# Patient Record
Sex: MALE | Race: WHITE | URBAN - METROPOLITAN AREA
[De-identification: names, ages, dates, MRNs, and addresses within clinical notes are randomized per-mention and may not be internally consistent; named-entity substitution may affect disease eponyms.]

---

## 2023-08-30 ENCOUNTER — OFFICE VISIT (OUTPATIENT)
Age: 48
End: 2023-08-30
Payer: COMMERCIAL

## 2023-08-30 VITALS
WEIGHT: 315 LBS | HEART RATE: 73 BPM | HEIGHT: 72 IN | SYSTOLIC BLOOD PRESSURE: 138 MMHG | OXYGEN SATURATION: 97 % | BODY MASS INDEX: 42.66 KG/M2 | DIASTOLIC BLOOD PRESSURE: 89 MMHG

## 2023-08-30 DIAGNOSIS — L97.919 VENOUS ULCER OF RIGHT LOWER EXTREMITY WITH VARICOSE VEINS (HCC): Primary | ICD-10-CM

## 2023-08-30 DIAGNOSIS — I83.019 VENOUS ULCER OF RIGHT LOWER EXTREMITY WITH VARICOSE VEINS (HCC): Primary | ICD-10-CM

## 2023-08-30 PROCEDURE — 99204 OFFICE O/P NEW MOD 45 MIN: CPT | Performed by: SURGERY

## 2023-08-30 RX ORDER — LISINOPRIL 40 MG/1
1 TABLET ORAL DAILY
COMMUNITY
Start: 2019-03-22

## 2023-08-30 RX ORDER — METOPROLOL TARTRATE 50 MG/1
50 TABLET, FILM COATED ORAL 2 TIMES DAILY WITH MEALS
COMMUNITY
Start: 2023-06-01

## 2023-08-30 RX ORDER — HYDRALAZINE HYDROCHLORIDE 100 MG/1
TABLET, FILM COATED ORAL
COMMUNITY
Start: 2019-01-17

## 2023-08-30 NOTE — PROGRESS NOTES
[facility]  [address]  Dept: [phone]           Chart reviewed for the following:   Casey HUDDLESTON, [address] [facility], have reviewed the medications and updated the Allergic reactions for [address] performed immediately prior to start of procedure:   Casey HUDDLESTON MA, have performed the following reviews on Elizabeth Green prior to the start of the procedure:            * Patient was identified by name and date of birth   * Agreement that Tonnie Petersont boot removed and reapplied   * Procedure site verified   * Patient was positioned for comfort  * Verbal consent was given by patient     Time: 1030      Date of procedure: 8/30/2023    Procedure performed by:   Crystal Lynn MD    How tolerated by patient: Tolerated                                                                                      Comments:  Applied hydrophera blue, drawtex, and unna boot  to right leg
currently treated with hydrofera blue. Wound healing has stalled, wound present > 1 year. Aguilar Mckinney CMA, instructed to apply hydrofera blue to wound and apply compression wrap (unna boot)       RLE varicose veins, edema and ulceration. - discussed pathophysiology of venous insufficiency and possible contribution to lack of wound healing progress  - Will schedule RLE venous reflux study  - patient to follow up after study completed         We reviewed the plan with the patient and the patient understands. In excess of 45 minutes were spent in face-to-face time with this patient, more than 50% of which was devoted to discussing diagnosis, treatment plan and possible intervention. Jose Macias MD PhD  Vascular Surgery          PLEASE NOTE:  This document has been produced using voice recognition software. Unrecognized errors in transcription may be present.

## 2023-09-06 ENCOUNTER — NURSE ONLY (OUTPATIENT)
Age: 48
End: 2023-09-06

## 2023-09-06 VITALS — BODY MASS INDEX: 42.66 KG/M2 | WEIGHT: 315 LBS | HEIGHT: 72 IN

## 2023-09-06 DIAGNOSIS — L97.919 VENOUS ULCER OF RIGHT LOWER EXTREMITY WITH VARICOSE VEINS (HCC): Primary | ICD-10-CM

## 2023-09-06 DIAGNOSIS — I83.019 VENOUS ULCER OF RIGHT LOWER EXTREMITY WITH VARICOSE VEINS (HCC): Primary | ICD-10-CM

## 2023-09-06 NOTE — PROGRESS NOTES
[facility]  [address]  Dept: [phone]           Chart reviewed for the following:   Demetrio Davenport LPN, have reviewed the medications and updated the Allergic reactions for [address] performed immediately prior to start of procedure:   Demetrio Davenport LPN, have performed the following reviews on [facility] prior to the start of the procedure:            * Patient was identified by name and date of birth   * Agreement that Shelton Nails boot removed and reapplied   * Procedure site verified   * Patient was positioned for comfort  * Verbal consent was given by patient     Time: 1746Z      Date of procedure: 9/6/2023    Procedure performed by:  VVS NURSE    How tolerated by patient: tolerated well     Comments:  adaptic to wound bed area; hydrofera blue applied; covered with abd pad; unna boot applied; secured with coban dressing ; tolerated well.

## 2023-09-14 ENCOUNTER — OFFICE VISIT (OUTPATIENT)
Age: 48
End: 2023-09-14
Payer: COMMERCIAL

## 2023-09-14 DIAGNOSIS — I83.019 VENOUS ULCER OF RIGHT LOWER EXTREMITY WITH VARICOSE VEINS (HCC): Primary | ICD-10-CM

## 2023-09-14 DIAGNOSIS — L97.919 VENOUS ULCER OF RIGHT LOWER EXTREMITY WITH VARICOSE VEINS (HCC): Primary | ICD-10-CM

## 2023-09-14 DIAGNOSIS — I10 HYPERTENSION: ICD-10-CM

## 2023-09-14 DIAGNOSIS — I63.9 STROKE (HCC): ICD-10-CM

## 2023-09-14 PROCEDURE — 3074F SYST BP LT 130 MM HG: CPT | Performed by: SURGERY

## 2023-09-14 PROCEDURE — 3078F DIAST BP <80 MM HG: CPT | Performed by: SURGERY

## 2023-09-14 PROCEDURE — 99213 OFFICE O/P EST LOW 20 MIN: CPT | Performed by: SURGERY

## 2023-09-14 NOTE — PROGRESS NOTES
Follow Up Wound Care Note    Nargis Valentin is a 52 y.o. male with  has a past medical history of Hypertension and Stroke ([address]) (2017). .     Patient returns in follow up for wound check and wound care. Since his last visit he has undergone a venous insufficiency study. The most recent PVL was reviewed and discussed with the patient. This shows no evidence of venous insufficiency or DVT    Interpretation Summary         No evidence of deep vein or superficial vein thrombosis in the right lower extremity. Vessels demonstrate normal compressibility, color filling, and phasic and spontaneous flow. No evidence of superficial venous reflux in the great or small saphenous veins. Interstitial edema visualized in the calf. For comparison purposes, the left common femoral vein was briefly interrogated. The vein demonstrates normal color filling and compressibility. Doppler flow was phasic and spontaneous. Procedure Details    A gray scale, color Doppler imaging and spectral Doppler analysis ultrasound was performed. During the study longitudinal and transverse views were obtained. Pulsed wave doppler was performed. Overall the study quality was adequate. Study was technically difficult due to: edema. Lower Extremity Venous Findings    Right Lower Venous    No evidence of deep vein or superficial vein thrombosis. The common femoral, saphenofemoral junction, profunda femoral, femoral, popliteal, posterior tibial, peroneal, greater saphenous, and small saphenous veins were imaged in the transverse view and showed normal compressibility. The common femoral, popliteal, middle femoral, posterior tibial, and peroneal veins were imaged in the longitudinal view and showed normal color filling and normal phasic and spontaneous flow. Peroneal Vein: Not visualized and grossly patent (cannot exclude non-occlusive thrombus). Posterior tibial artery Doppler waveforms are multiphasic.    Reflux study patient

## 2023-09-15 VITALS
RESPIRATION RATE: 18 BRPM | BODY MASS INDEX: 42.66 KG/M2 | HEART RATE: 86 BPM | SYSTOLIC BLOOD PRESSURE: 128 MMHG | HEIGHT: 72 IN | OXYGEN SATURATION: 99 % | DIASTOLIC BLOOD PRESSURE: 70 MMHG | WEIGHT: 315 LBS

## 2023-09-15 PROBLEM — I63.9 STROKE (HCC): Status: ACTIVE | Noted: 2017-01-01

## 2023-09-15 PROBLEM — I10 HYPERTENSION: Status: ACTIVE | Noted: 2023-09-15

## 2023-09-15 RX ORDER — FUROSEMIDE 40 MG/1
TABLET ORAL
COMMUNITY
Start: 2023-09-13

## 2023-09-15 RX ORDER — LISINOPRIL 10 MG/1
TABLET ORAL
COMMUNITY
Start: 2023-09-13

## 2023-09-15 ASSESSMENT — PATIENT HEALTH QUESTIONNAIRE - PHQ9
SUM OF ALL RESPONSES TO PHQ QUESTIONS 1-9: 0
SUM OF ALL RESPONSES TO PHQ9 QUESTIONS 1 & 2: 0
SUM OF ALL RESPONSES TO PHQ QUESTIONS 1-9: 0
1. LITTLE INTEREST OR PLEASURE IN DOING THINGS: 0
2. FEELING DOWN, DEPRESSED OR HOPELESS: 0

## 2023-09-21 ENCOUNTER — NURSE ONLY (OUTPATIENT)
Age: 48
End: 2023-09-21

## 2023-09-21 DIAGNOSIS — L97.919 VENOUS ULCER OF RIGHT LOWER EXTREMITY WITH VARICOSE VEINS (HCC): Primary | ICD-10-CM

## 2023-09-21 DIAGNOSIS — I83.019 VENOUS ULCER OF RIGHT LOWER EXTREMITY WITH VARICOSE VEINS (HCC): Primary | ICD-10-CM

## 2023-09-21 NOTE — PROGRESS NOTES
[facility]  [address]  Dept: [phone]           Chart reviewed for the following:   Odalis Vasquez MA, have reviewed the medications and updated the Allergic reactions for [address] performed immediately prior to start of procedure:   Odalis Vasquez MA, have performed the following reviews on Domnick Session prior to the start of the procedure:            * Patient was identified by name and date of birth   * Agreement that [place] Dellen boot removed and reapplied   * Procedure site verified   * Patient was positioned for comfort  * Verbal consent was given by patient     Time: 11 am       Date of procedure: 9/21/2023    Procedure performed by:  VVS NURSE    How tolerated by patient: tolerated     Comments: removed old dressing, cleaned the area, measured wound it's 1.5cm x 1.5cm  picture in media, applied hydrofera blue abd pad wrapped with Ildefonso Dellen boot and coban

## 2023-09-28 ENCOUNTER — NURSE ONLY (OUTPATIENT)
Age: 48
End: 2023-09-28

## 2023-09-28 VITALS — HEIGHT: 72 IN | WEIGHT: 315 LBS | BODY MASS INDEX: 42.66 KG/M2

## 2023-09-28 DIAGNOSIS — L97.919 VENOUS ULCER OF RIGHT LOWER EXTREMITY WITH VARICOSE VEINS (HCC): Primary | ICD-10-CM

## 2023-09-28 DIAGNOSIS — I83.019 VENOUS ULCER OF RIGHT LOWER EXTREMITY WITH VARICOSE VEINS (HCC): Primary | ICD-10-CM

## 2023-09-28 NOTE — PROGRESS NOTES
[facility]  [address]  Dept: [phone]           Chart reviewed for the following:   Batool HUDDLESTON, [address] [facility], have reviewed the medications and updated the Allergic reactions for [address] performed immediately prior to start of procedure:   Batool HUDDLESTON MA, have performed the following reviews on [facility] prior to the start of the procedure:            * Patient was identified by name and date of birth   * Agreement that Caldwell Primus boot removed and reapplied   * Procedure site verified   * Patient was positioned for comfort  * Verbal consent was given by patient     Time: 0170      Date of procedure: 9/28/2023    Procedure performed by:  VVS NURSE    How tolerated by patient: Tolerated                                                                                     Comments:  Washed leg with ez scrub. Applied hydrofera blue and unna boot to leg.

## 2023-10-03 NOTE — PROGRESS NOTES
Follow Up Wound Care Note    Stephanie Jones is a 52 y.o. male with  has a past medical history of Hypertension and Stroke ([address]) (2017). .     Patient returns in follow up for wound check and wound care. Since his last visit he has been evaluated by [facility] medical and is waiting for CircAid compression garments. Wound Assessment:     Wound location: right anterior leg    Wound Size: healed with eschar in place    (Previous visit wound size: 2 cm x 2 cm x 0.1 cm)    Tissue characteristics:  Eschar in place, no open areas    Patient compliant with treatment? [x] Yes  [] No       Today:         Last visit:          Treatment Plan and Interventions:     Wound has essentially healed, with eschar in place and no open areas    Will proceed with the following wound care plan:   - moisturize and K2 wrap with weekly changes until we have compression stockings  - once he has compression stockings will d/c K2. Discussed need for immediate return if wound were to recur          Past Medical History:   Diagnosis Date    Hypertension     Stroke [facility]) 2017        Past Surgical History:   Procedure Laterality Date    HERNIA REPAIR  2003       Current Outpatient Medications   Medication Sig Dispense Refill    furosemide (LASIX) 40 MG tablet       lisinopril (PRINIVIL;ZESTRIL) 10 MG tablet       hydrALAZINE (APRESOLINE) 100 MG tablet take 1 tablet by mouth three times a day Orally Three times a day for 90 days      metoprolol tartrate (LOPRESSOR) 50 MG tablet Take 1 tablet by mouth with breakfast and with evening meal       No current facility-administered medications for this visit.        No Known Allergies

## 2023-10-05 ENCOUNTER — OFFICE VISIT (OUTPATIENT)
Age: 48
End: 2023-10-05
Payer: COMMERCIAL

## 2023-10-05 VITALS
SYSTOLIC BLOOD PRESSURE: 154 MMHG | OXYGEN SATURATION: 98 % | HEART RATE: 79 BPM | RESPIRATION RATE: 20 BRPM | DIASTOLIC BLOOD PRESSURE: 110 MMHG

## 2023-10-05 DIAGNOSIS — L97.919 VENOUS ULCER OF RIGHT LOWER EXTREMITY WITH VARICOSE VEINS (HCC): Primary | ICD-10-CM

## 2023-10-05 DIAGNOSIS — I83.019 VENOUS ULCER OF RIGHT LOWER EXTREMITY WITH VARICOSE VEINS (HCC): Primary | ICD-10-CM

## 2023-10-05 PROCEDURE — 29581 APPL MULTLAYER CMPRN SYS LEG: CPT | Performed by: SURGERY

## 2023-10-05 NOTE — PROGRESS NOTES
1. Have you been to the ER, urgent care clinic since your last visit? Hospitalized since your last visit? No    2. Have you seen or consulted any other health care providers outside of the [address] [place] [address] since your last visit? Include any pap smears or colon screening.  No

## 2023-10-05 NOTE — PROGRESS NOTES
[facility]  [address]  Dept: [phone]           Chart reviewed for the following:   Rodo Davenport LPN, have reviewed the medications and updated the Allergic reactions for [address] performed immediately prior to start of procedure:   Rodo Davenport LPN, have performed the following reviews on Susie Chand prior to the start of the procedure:            * Patient was identified by name and date of birth   * Agreement that Mary Cousin boot removed and reapplied   * Procedure site verified   * Patient was positioned for comfort  * Verbal consent was given by patient     Time: 1100hrs       Date of procedure: 10/5/2023    Procedure performed by: Prachi Dinh MD    How tolerated by patient: tolerated well     Comments:  old unna boot removed ; area to rle noted with scabbing and dry flaky skin to periphery; area cleansed with dwc; patted dry; area and periphery moisturized ; adaptic applied to cover scabbed area; unna boot applied; secured with coban dressing; tolerated well.

## 2023-10-12 ENCOUNTER — NURSE ONLY (OUTPATIENT)
Age: 48
End: 2023-10-12

## 2023-10-12 DIAGNOSIS — I83.019 VENOUS ULCER OF RIGHT LOWER EXTREMITY WITH VARICOSE VEINS (HCC): Primary | ICD-10-CM

## 2023-10-12 DIAGNOSIS — L97.919 VENOUS ULCER OF RIGHT LOWER EXTREMITY WITH VARICOSE VEINS (HCC): Primary | ICD-10-CM

## 2023-10-12 NOTE — PROGRESS NOTES
[facility]  [address]  Dept: [phone]           Chart reviewed for the following:   Kelsea Davenport LPN, have reviewed the medications and updated the Allergic reactions for [address] performed immediately prior to start of procedure:   I, Stuart Kayser, LPN, have performed the following reviews on Maricruz Becerra prior to the start of the procedure:            * Patient was identified by name and date of birth   * Agreement that Thony Dew boot removed and reapplied   * Procedure site verified   * Patient was positioned for comfort  * Verbal consent was given by patient     Time: 1045hrs      Date of procedure: 10/12/2023    Procedure performed by:  VVS NURSE    How tolerated by patient: tolerated well     Comments: old unna boot removed ; area to rle noted with scabbing and dry flaky skin to periphery; area cleansed with dwc; patted dry; area and periphery moisturized ; adaptic applied to cover scabbed area; unna boot applied; secured with coban dressing; tolerated well.

## 2023-10-19 ENCOUNTER — NURSE ONLY (OUTPATIENT)
Age: 48
End: 2023-10-19

## 2023-10-19 NOTE — PROGRESS NOTES
[facility]  [address]  Dept: [phone]           Chart reviewed for the following:   Ilene HUDDLESTON [place], have reviewed the medications and updated the Allergic reactions for [address] performed immediately prior to start of procedure:   Ilene HUDDLESTON MA, have performed the following reviews on Natalia Heart prior to the start of the procedure:            * Patient was identified by name and date of birth   * Agreement that Pruitt Chiquito boot removed and reapplied   * Procedure site verified   * Patient was positioned for comfort  * Verbal consent was given by patient     Time: 7902      Date of procedure: 10/19/2023    Procedure performed by:  VVS NURSE    How tolerated by patient: Tolerated. Wound almost healed. Will bring circaid to next visit. Comments:  Applied aquacel, hydrocortisone and unna boot to left leg.

## 2023-10-26 ENCOUNTER — NURSE ONLY (OUTPATIENT)
Age: 48
End: 2023-10-26

## 2023-10-26 DIAGNOSIS — I83.019 VENOUS ULCER OF RIGHT LOWER EXTREMITY WITH VARICOSE VEINS (HCC): Primary | ICD-10-CM

## 2023-10-26 DIAGNOSIS — L97.919 VENOUS ULCER OF RIGHT LOWER EXTREMITY WITH VARICOSE VEINS (HCC): Primary | ICD-10-CM

## 2023-10-26 NOTE — PROGRESS NOTES
[facility]  [address]  Dept: [phone]           Chart reviewed for the following:   Hanna Staples MA, have reviewed the medications and updated the Allergic reactions for [address] performed immediately prior to start of procedure:   Escobar HUDDLESTON MA, [facility] the [facility] to the start of the procedure:            * Patient was identified by name and date of birth   * Agreement that Sherman Slipper boot removed and reapplied   * Procedure site verified   * Patient was positioned for comfort  * Verbal consent was given by patient     Time: 10:45 am       Date of procedure: 10/26/2023    Procedure performed by:  VVS NURSE    How tolerated by patient:  tolerated well     Comments:  pt came in for dressing change brought his compression socks with him and asked if he could start using them instead of wrap, removed old dressing and washed and dried leg took pictures and talked to provider, provider agreed that pt could use compression socks and to cover the open area with a Allevyn bandage and for the pt to follow up in two weeks

## 2023-11-13 ENCOUNTER — NURSE ONLY (OUTPATIENT)
Age: 48
End: 2023-11-13

## 2023-11-13 DIAGNOSIS — I83.019 VENOUS ULCER OF RIGHT LOWER EXTREMITY WITH VARICOSE VEINS (HCC): Primary | ICD-10-CM

## 2023-11-13 DIAGNOSIS — L97.919 VENOUS ULCER OF RIGHT LOWER EXTREMITY WITH VARICOSE VEINS (HCC): Primary | ICD-10-CM

## 2023-11-13 NOTE — PROGRESS NOTES
Pt's wounds completely healed. No swelling. Pt has circaid and very pleased with it. Small wound on the lateral side of foot.  Told to monitor it and to call office if I gets worst.

## 2023-11-28 ENCOUNTER — OFFICE VISIT (OUTPATIENT)
Age: 48
End: 2023-11-28
Payer: COMMERCIAL

## 2023-11-28 DIAGNOSIS — I83.019 VENOUS ULCER OF RIGHT LOWER EXTREMITY WITH VARICOSE VEINS (HCC): Primary | ICD-10-CM

## 2023-11-28 DIAGNOSIS — L97.919 VENOUS ULCER OF RIGHT LOWER EXTREMITY WITH VARICOSE VEINS (HCC): Primary | ICD-10-CM

## 2023-11-28 PROCEDURE — 99213 OFFICE O/P EST LOW 20 MIN: CPT | Performed by: SURGERY

## 2023-11-28 PROCEDURE — 3078F DIAST BP <80 MM HG: CPT | Performed by: SURGERY

## 2023-11-28 PROCEDURE — 29580 STRAPPING UNNA BOOT: CPT | Performed by: SURGERY

## 2023-11-28 PROCEDURE — 3074F SYST BP LT 130 MM HG: CPT | Performed by: SURGERY

## 2023-11-28 NOTE — PROGRESS NOTES
Follow Up Wound Care Note    Rea Hooper is a 52 y.o. male with  has a past medical history of Hypertension and Stroke ([address]) (2017). .     Patient presents today for evaluation of new wound. He states that a right lateral foot wound has developed and worsened over the past couple of weeks since he was last seen. Original anterior calf wound remains closed. Denies drainage or pain in wound bed. He is unaware of any injury or rubbing of shoes. Wound Assessment:     Wound location: right lateral foot    Wound Size: 2cm x 2cm x 0.1cm     (Previous visit wound size: n/a - new wound)    Tissue characteristics:  scant fibrinous exudate in wound bed. No surrounding erythema, no drainage, no odor. Patient compliant with treatment? [x] Yes - wearing circAid compression and elevating legs  [] No       Today:       Previous imaging study was reviewed and discussed with the patient. This is a venous insufficiency study dated 9/6/23. This shows no venous insufficiency and multiphasic posterior tibial waveforms. Treatment Plan and Interventions:     New wound on right lateral foot. Will proceed with the following wound care plan:   - hydrofera blue to wound bed  - Unna boot for compression  - if no improvement over the next couple weeks, will consider oasis     Follow up weekly for dressing changes      I have personally spent more than 20 minutes today in preparation, patient care and documentation for this visit, including the following: review of medical record and previous imaging studies, devising wound care plan and discussion with patient and documentation in the chart. Time spent is in addition to time required to perform procedure. Given this is a new problem (new wound), full assessment was required and therefore, both an E/M visit and a procedure code will be billed.      Past Medical History:   Diagnosis Date    Hypertension     Stroke [facility]) 2017        Past Surgical History:   Procedure

## 2023-11-29 VITALS
SYSTOLIC BLOOD PRESSURE: 235 MMHG | WEIGHT: 315 LBS | BODY MASS INDEX: 42.66 KG/M2 | DIASTOLIC BLOOD PRESSURE: 140 MMHG | HEART RATE: 97 BPM | HEIGHT: 72 IN | OXYGEN SATURATION: 98 %

## 2023-11-29 NOTE — PROGRESS NOTES
[facility]  [address]  Dept: [phone]           Chart reviewed for the following:   I, N[facility]y Councilman, [facility], have reviewed the medications and updated the Allergic reactions for [address] performed immediately prior to start of procedure:   I, Nancey Councilman, [place], have performed the following reviews on Jenny Beltre prior to the start of the procedure:            * Patient was identified by name and date of birth   * Agreement that Redge Bounds boot removed and reapplied   * Procedure site verified   * Patient was positioned for comfort  * Verbal consent was given by patient     Time: 1530      Date of procedure: 11/29/2023    Procedure performed by: Rosibel Bustillos MD    How tolerated by patient: Pain in foot when walking                                                                                   Comments:  Applied hydrofera blue, drawtex and unna boot. Wound care provided under my direct supervision.    Rosibel Bustillos MD PhD  Vascular Surgery

## 2023-12-05 ENCOUNTER — NURSE ONLY (OUTPATIENT)
Age: 48
End: 2023-12-05

## 2023-12-05 NOTE — PROGRESS NOTES
[facility]  [address]  Dept: [phone]           Chart reviewed for the following:   Adilia HUDDLESTON [place], have reviewed the medications and updated the Allergic reactions for [address] performed immediately prior to start of procedure:   Adilia HUDDLESTON MA, have performed the following reviews on Nargis Valentin prior to the start of the procedure:            * Patient was identified by name and date of birth   * Agreement that Kaaren Needy boot removed and reapplied   * Procedure site verified   * Patient was positioned for comfort  * Verbal consent was given by patient     Time: 8258      Date of procedure: 12/5/2023    Procedure performed by:  VVS NURSE    How tolerated by patient:  Pt c/o pain when walking. Moderate drainage. States he is taking 4-5 ibuprofen a day and asked if there is anything else he can take. I advised that he should alternate tylenol and ibuprofen. Comments:  Applied hydrofera blue, abd pad, unna boot with kerlix and coban.     Wound dimensions 3cmx2.5cm

## 2023-12-06 ENCOUNTER — OFFICE VISIT (OUTPATIENT)
Age: 48
End: 2023-12-06
Payer: COMMERCIAL

## 2023-12-06 VITALS
HEIGHT: 72 IN | BODY MASS INDEX: 42.66 KG/M2 | DIASTOLIC BLOOD PRESSURE: 100 MMHG | WEIGHT: 315 LBS | SYSTOLIC BLOOD PRESSURE: 150 MMHG | HEART RATE: 72 BPM | OXYGEN SATURATION: 97 %

## 2023-12-06 DIAGNOSIS — I83.019 VENOUS ULCER OF RIGHT LOWER EXTREMITY WITH VARICOSE VEINS (HCC): Primary | ICD-10-CM

## 2023-12-06 DIAGNOSIS — L97.919 VENOUS ULCER OF RIGHT LOWER EXTREMITY WITH VARICOSE VEINS (HCC): Primary | ICD-10-CM

## 2023-12-06 PROCEDURE — 29580 STRAPPING UNNA BOOT: CPT | Performed by: SURGERY

## 2023-12-06 NOTE — PROGRESS NOTES
[facility]  [address]  Dept: [phone]           Chart reviewed for the following:   Katya HUDDLESTON [place], have reviewed the medications and updated the Allergic reactions for [address] performed immediately prior to start of procedure:   Katya HUDDLESTON MA, have performed the following reviews on Shakir Miller prior to the start of the procedure:            * Patient was identified by name and date of birth   * Agreement that Elex Spotted boot removed and reapplied   * Procedure site verified   * Patient was positioned for comfort  * Verbal consent was given by patient     Time: 1500      Date of procedure: 12/6/2023    Procedure performed by: Bryon Gitelman, MD    How tolerated by patient:   C/o pain when walking                                                                                    Comments:  Applied iodosorb, mepelix and unna boot to right leg.        Bryon Gitelman, MD PhD  Vascular Surgery

## 2023-12-06 NOTE — PROGRESS NOTES
Follow Up Wound Care Note    Danica Mcgovern is a 52 y.o. male with  has a past medical history of Hypertension and Stroke ([address]) (2017). .     Patient returns in follow up for wound check. He states he is experiencing pain in the heel of the foot. Wound Assessment:     Wound location: right lateral foot    Wound Size: 3cm x 2cm x 0.1cm       Tissue characteristics:  more fibrinous exudate in wound bed than at previous visit. No surrounding erythema, no drainage, no odor. Patient compliant with treatment? [x] Yes   [] No       Today:     Last visit:        Treatment Plan and Interventions:     Wound has not changed significantly since last visit. Will proceed with the following wound care plan:   - iodosorb to wound bed  - Unna boot for compression  - ibuprofen and tylenol scheduled for pain management    Follow up in 2 days for dressing change and in 1 week with me        Past Medical History:   Diagnosis Date    Hypertension     Stroke [facility]) 2017        Past Surgical History:   Procedure Laterality Date    HERNIA REPAIR  2003       Current Outpatient Medications   Medication Sig Dispense Refill    furosemide (LASIX) 40 MG tablet       lisinopril (PRINIVIL;ZESTRIL) 10 MG tablet       hydrALAZINE (APRESOLINE) 100 MG tablet take 1 tablet by mouth three times a day Orally Three times a day for 90 days      metoprolol tartrate (LOPRESSOR) 50 MG tablet Take 1 tablet by mouth with breakfast and with evening meal       No current facility-administered medications for this visit.        No Known Allergies

## 2023-12-06 NOTE — PATIENT INSTRUCTIONS
For the next 5 days, do the following:   - 800mg of ibuprofen (4 pills of 200mg) 3x/day with meals  - 650mg of tylenol (2 pills of 325mg) 2x/day with meals    Keep taking even when the pain improves.

## 2023-12-08 ENCOUNTER — NURSE ONLY (OUTPATIENT)
Age: 48
End: 2023-12-08

## 2023-12-08 NOTE — PROGRESS NOTES
[facility]  [address]  Dept: [phone]           Chart reviewed for the following:   Nicolette HUDDLESTON, 45[address] [facility], have reviewed the medications and updated the Allergic reactions for [address] performed immediately prior to start of procedure:   Nicolette HUDDLESTON MA, have performed the following reviews on Ondina Martinez prior to the start of the procedure:            * Patient was identified by name and date of birth   * Agreement that Tyler Lambsruthi boot removed and reapplied   * Procedure site verified   * Patient was positioned for comfort  * Verbal consent was given by patient     Time: 1130      Date of procedure: 12/8/2023    Procedure performed by:  VVS NURSE    How tolerated by patient:     C/o pain to touch and when walking                                                                                 Comments: Applied iodosorb, mepelix and unna boot to right leg.

## 2023-12-13 ENCOUNTER — OFFICE VISIT (OUTPATIENT)
Age: 48
End: 2023-12-13
Payer: COMMERCIAL

## 2023-12-13 VITALS
WEIGHT: 315 LBS | BODY MASS INDEX: 42.66 KG/M2 | HEIGHT: 72 IN | SYSTOLIC BLOOD PRESSURE: 168 MMHG | HEART RATE: 63 BPM | OXYGEN SATURATION: 97 % | RESPIRATION RATE: 20 BRPM | DIASTOLIC BLOOD PRESSURE: 96 MMHG

## 2023-12-13 DIAGNOSIS — I83.019 VENOUS ULCER OF RIGHT LOWER EXTREMITY WITH VARICOSE VEINS (HCC): Primary | ICD-10-CM

## 2023-12-13 DIAGNOSIS — L97.919 VENOUS ULCER OF RIGHT LOWER EXTREMITY WITH VARICOSE VEINS (HCC): Primary | ICD-10-CM

## 2023-12-13 PROCEDURE — 29580 STRAPPING UNNA BOOT: CPT | Performed by: SURGERY

## 2023-12-13 NOTE — PROGRESS NOTES
[facility]  [address] [facility] [address] [place] [address]  Dept: [phone]           Chart reviewed for the following:   Maribel HUDDLESTON, 45[address] [facility], have reviewed the medications and updated the Allergic reactions for [address] performed immediately prior to start of procedure:   Maribel HUDDLESTON MA, have performed the following reviews on [facility] prior to the start of the procedure:            * Patient was identified by name and date of birth   * Agreement that Alinda Halt boot removed and reapplied   * Procedure site verified   * Patient was positioned for comfort  * Verbal consent was given by patient     Time: 1000      Date of procedure: 12/13/2023    Procedure performed by: Sandra Elder MD    How tolerated by patient:   Some pain                                                                                    Comments: Applied iodosorb, mepilex and unna boot to right leg.     - patient instructed to purchase postop shoe to prevent rubbing along the side of the foot by his tennis shoes        Sandra Elder MD PhD  Vascular Surgery

## 2023-12-29 ENCOUNTER — OFFICE VISIT (OUTPATIENT)
Age: 48
End: 2023-12-29

## 2023-12-29 VITALS — HEART RATE: 72 BPM | DIASTOLIC BLOOD PRESSURE: 100 MMHG | SYSTOLIC BLOOD PRESSURE: 170 MMHG | OXYGEN SATURATION: 98 %

## 2023-12-29 DIAGNOSIS — I83.019 VENOUS ULCER OF RIGHT LOWER EXTREMITY WITH VARICOSE VEINS (HCC): Primary | ICD-10-CM

## 2023-12-29 DIAGNOSIS — L97.919 VENOUS ULCER OF RIGHT LOWER EXTREMITY WITH VARICOSE VEINS (HCC): Primary | ICD-10-CM

## 2023-12-29 RX ORDER — TRAMADOL HYDROCHLORIDE 50 MG/1
50 TABLET ORAL EVERY 6 HOURS PRN
Qty: 28 TABLET | Refills: 0 | Status: SHIPPED | OUTPATIENT
Start: 2023-12-29 | End: 2024-01-05

## 2023-12-29 NOTE — PROGRESS NOTES
Follow Up Wound Care Note    Aviva Thomas is a 52 y.o. male with  has a past medical history of Hypertension and Stroke ([address]) (2017). .     Patient returns in follow up for wound check and wound care. Wound Assessment:     Wound location: right lateral foot    Wound Size: 9 cm x 4.5 cm x 0.1 cm    (Previous visit wound size: 5 cm x 3.5 cm x 0.1 cm)    Tissue characteristics:  fibrinous exudate in the wound bed. Painful. Patient compliant with treatment? [x] Yes  [] No       Today:       Last visit:         Treatment Plan and Interventions:     Wound is worsening and increasing in size. Will proceed with the following wound care plan:   - medihoney to the wound bed  - will return in a week for debridement with lidocaine anesthesia  - tramadol sent to pharmacy for pain control  - patient needs a surgical shoe to avoid rubbing on the side of the foot      Past Medical History:   Diagnosis Date    Hypertension     Stroke [facility]) 2017        Past Surgical History:   Procedure Laterality Date    HERNIA REPAIR  2003       Current Outpatient Medications   Medication Sig Dispense Refill    furosemide (LASIX) 40 MG tablet       lisinopril (PRINIVIL;ZESTRIL) 10 MG tablet       hydrALAZINE (APRESOLINE) 100 MG tablet take 1 tablet by mouth three times a day Orally Three times a day for 90 days      metoprolol tartrate (LOPRESSOR) 50 MG tablet Take 1 tablet by mouth with breakfast and with evening meal       No current facility-administered medications for this visit.        No Known Allergies

## 2023-12-29 NOTE — PROGRESS NOTES
[facility]  [address]  Dept: [phone]           Chart reviewed for the following:   I, Genaro Goldmann, [address], have reviewed the medications and updated the Allergic reactions for [address] performed immediately prior to start of procedure:   I, Genaro Goldmann, MA, have performed the following reviews on [facility] prior to the start of the procedure:            * Patient was identified by name and date of birth   * Agreement that Austin Glaze boot removed and reapplied   * Procedure site verified   * Patient was positioned for comfort  * Verbal consent was given by patient     Time: 1530      Date of procedure: 12/29/2023    Procedure performed by:   Santos Ambrosio MD    How tolerated by patient:  Painful to touch                                                                                    Comments:  Applied medihoney, drawtex and unna mary beth with Corinne Borrow, MD PhD  Vascular Surgery

## 2024-01-04 ENCOUNTER — TELEPHONE (OUTPATIENT)
Age: 49
End: 2024-01-04

## 2024-01-04 NOTE — TELEPHONE ENCOUNTER
Patient called and stated that he called [id number] to set up an appointment and when they told the person he was talking to where the wound is located, the office stated that they might not have the right shoe for him. He was not seen at the office yet. Will talk to Dr. Baum and will call back the patient.

## 2024-01-05 ENCOUNTER — PROCEDURE VISIT (OUTPATIENT)
Age: 49
End: 2024-01-05

## 2024-01-05 ENCOUNTER — TELEPHONE (OUTPATIENT)
Age: 49
End: 2024-01-05

## 2024-01-05 DIAGNOSIS — L97.919 VENOUS ULCER OF RIGHT LOWER EXTREMITY WITH VARICOSE VEINS (HCC): Primary | ICD-10-CM

## 2024-01-05 DIAGNOSIS — I83.019 VENOUS ULCER OF RIGHT LOWER EXTREMITY WITH VARICOSE VEINS (HCC): Primary | ICD-10-CM

## 2024-01-05 NOTE — PROGRESS NOTES
[facility]  [address]  [address]  [facility] [address]  Dept: [phone]           Chart reviewed for the following:   Myah HUDDLESTON MA, have reviewed the medications and updated the Allergic reactions for Mitch Alegria     TIME OUT performed immediately prior to start of procedure:   Myah HUDDLESTON MA, have performed the following reviews on Mitch Alegria prior to the start of the procedure:            * Patient was identified by name and date of birth   * Agreement that prem boot removed and reapplied   * Procedure site verified   * Patient was positioned for comfort  * Verbal consent was given by patient     Time: 1500      Date of procedure: 1/5/2024    Procedure performed by:  Libertad Baum MD    How tolerated by patient:   Painful                                                                                   Comments:  Applied medihoney, drawtex, unna boot and coban        
was brought to the procedure room and placed on the table in left lateral decubitus position. 4% Lidocaine cream was applied to the wound bed and surrounding areas. This was allowed to take effect for 15 minutes prior to debridement.    The wound bed was cleansed thoroughly and then sharply debrided with a scalpel and a curette. This was able to remove the majority of bulky devitalized tissue. Robust bleeding and beefy red tissue revealed.          Libertad Baum MD PhD   Vascular Surgery       Past Medical History:   Diagnosis Date    Hypertension     Stroke (HCC) 2017        Past Surgical History:   Procedure Laterality Date    HERNIA REPAIR  2003       Current Outpatient Medications   Medication Sig Dispense Refill    traMADol (ULTRAM) 50 MG tablet Take 1 tablet by mouth every 6 hours as needed for Pain for up to 7 days. Intended supply: 5 days. Take lowest dose possible to manage pain Max Daily Amount: 200 mg 28 tablet 0    furosemide (LASIX) 40 MG tablet       lisinopril (PRINIVIL;ZESTRIL) 10 MG tablet       hydrALAZINE (APRESOLINE) 100 MG tablet take 1 tablet by mouth three times a day Orally Three times a day for 90 days      metoprolol tartrate (LOPRESSOR) 50 MG tablet Take 1 tablet by mouth with breakfast and with evening meal       No current facility-administered medications for this visit.       No Known Allergies

## 2024-01-05 NOTE — TELEPHONE ENCOUNTER
----- Message from Jessa Valle sent at 1/4/2024  1:18 PM EST -----  Patient called needs refill on tramadol     [facility]

## 2024-01-12 ENCOUNTER — PROCEDURE VISIT (OUTPATIENT)
Age: 49
End: 2024-01-12
Payer: COMMERCIAL

## 2024-01-12 VITALS — OXYGEN SATURATION: 100 % | DIASTOLIC BLOOD PRESSURE: 70 MMHG | HEART RATE: 74 BPM | SYSTOLIC BLOOD PRESSURE: 120 MMHG

## 2024-01-12 DIAGNOSIS — I83.019 VENOUS ULCER OF RIGHT LOWER EXTREMITY WITH VARICOSE VEINS (HCC): Primary | ICD-10-CM

## 2024-01-12 DIAGNOSIS — L97.919 VENOUS ULCER OF RIGHT LOWER EXTREMITY WITH VARICOSE VEINS (HCC): Primary | ICD-10-CM

## 2024-01-12 PROCEDURE — 29580 STRAPPING UNNA BOOT: CPT | Performed by: SURGERY

## 2024-01-12 RX ORDER — TRAMADOL HYDROCHLORIDE 50 MG/1
50 TABLET ORAL EVERY 4 HOURS PRN
Qty: 42 TABLET | Refills: 0 | Status: SHIPPED | OUTPATIENT
Start: 2024-01-12 | End: 2024-01-19

## 2024-01-12 NOTE — PROGRESS NOTES
[facility]  [address],  [address]  [facility] [address]  Dept: [phone]           Chart reviewed for the following:   Myah HUDDLESTON MA, have reviewed the medications and updated the Allergic reactions for Mitch Alegria     TIME OUT performed immediately prior to start of procedure:   Myah HUDDLESTON MA, have performed the following reviews on Mitch Alegria prior to the start of the procedure:            * Patient was identified by name and date of birth   * Agreement that prem boot removed and reapplied   * Procedure site verified   * Patient was positioned for comfort  * Verbal consent was given by patient     Time: 1430      Date of procedure: 1/12/2024    Procedure performed by:  Libertad Baum MD    How tolerated by patient:  Painful and c/o cold                                                                                   Comments: Applied medihoney, drawtex, unna boot and keo Baum MD PhD  Vascular Surgery            
tartrate (LOPRESSOR) 50 MG tablet Take 1 tablet by mouth with breakfast and with evening meal       No current facility-administered medications for this visit.       No Known Allergies

## 2024-01-12 NOTE — PATIENT INSTRUCTIONS
Pain Management Plan:     - Aleve 440mg (2 tabs) 3x/day with meals  - Tylenol 500mg 3x/day between meals and at bedtime  - Take Tramadol 50mg in the morning and at bedtime

## 2024-01-19 ENCOUNTER — OFFICE VISIT (OUTPATIENT)
Age: 49
End: 2024-01-19

## 2024-01-19 DIAGNOSIS — L97.919 VENOUS ULCER OF RIGHT LOWER EXTREMITY WITH VARICOSE VEINS (HCC): Primary | ICD-10-CM

## 2024-01-19 DIAGNOSIS — I83.019 VENOUS ULCER OF RIGHT LOWER EXTREMITY WITH VARICOSE VEINS (HCC): Primary | ICD-10-CM

## 2024-01-19 NOTE — PROGRESS NOTES
[facility]  [address],  [address]  [facility] [address]  Dept: [phone]           Chart reviewed for the following:   Myah HUDDLESTON MA, have reviewed the medications and updated the Allergic reactions for Mitch Alegria     TIME OUT performed immediately prior to start of procedure:   Myah HUDDLESTON MA, have performed the following reviews on Mitch Alegria prior to the start of the procedure:            * Patient was identified by name and date of birth   * Agreement that prem boot removed and reapplied   * Procedure site verified   * Patient was positioned for comfort  * Verbal consent was given by patient     Time: 1530      Date of procedure: 1/19/2024    Procedure performed by:  Libertad Baum MD    How tolerated by patient:   Still in pain                                                                                    Comments:  Applied medihoney, mepilex, exudry, unna boot with kerlix and coban.  Wound dimensions 9cmX 2.5cm      Wound care provided under my direct supervision.     Libertad Baum MD PhD  Vascular Surgery

## 2024-01-19 NOTE — PROGRESS NOTES
Follow Up Wound Care Note    Mitch Alegria is a 48 y.o. male with  has a past medical history of Hypertension and Stroke ([facility]) (2017). .     Patient returns in follow up for wound check and wound care.    Wound Assessment:     Wound location: right lateral foot    Wound Size: 9cm x 2.5cm x 0.1cm    (Previous visit wound size: 8.5 cm x 3.5 cm x 0.1 cm)    Tissue characteristics:  Very minimal exudate in the wound bed. Painful. Granulation tissue in wound bed. No surrounding erythema, no drainage.     Patient compliant with treatment?   [x] Yes  [] No       Today:       Last visit          Treatment Plan and Interventions:     Wound is improved in appearance, slightly decreased in size.     Will proceed with the following wound care plan:   - Medihoney to the wound bed, drawtex absorbent and unna boot placed to right leg   - continue with surgical shoe        Libertad Baum MD PhD   Vascular Surgery       Past Medical History:   Diagnosis Date    Hypertension     Stroke (HCC) 2017        Past Surgical History:   Procedure Laterality Date    HERNIA REPAIR  2003       Current Outpatient Medications   Medication Sig Dispense Refill    traMADol (ULTRAM) 50 MG tablet Take 1 tablet by mouth every 4 hours as needed for Pain for up to 7 days. Intended supply: 7 days. Take lowest dose possible to manage pain Max Daily Amount: 300 mg 42 tablet 0    furosemide (LASIX) 40 MG tablet       lisinopril (PRINIVIL;ZESTRIL) 10 MG tablet       hydrALAZINE (APRESOLINE) 100 MG tablet take 1 tablet by mouth three times a day Orally Three times a day for 90 days      metoprolol tartrate (LOPRESSOR) 50 MG tablet Take 1 tablet by mouth with breakfast and with evening meal       No current facility-administered medications for this visit.       No Known Allergies

## 2024-01-29 ENCOUNTER — NURSE ONLY (OUTPATIENT)
Age: 49
End: 2024-01-29

## 2024-01-29 DIAGNOSIS — L97.919 VENOUS ULCER OF RIGHT LOWER EXTREMITY WITH VARICOSE VEINS (HCC): Primary | ICD-10-CM

## 2024-01-29 DIAGNOSIS — I83.019 VENOUS ULCER OF RIGHT LOWER EXTREMITY WITH VARICOSE VEINS (HCC): Primary | ICD-10-CM

## 2024-01-29 NOTE — PROGRESS NOTES
[facility]  [address],  [address]  [facility] [address]  Dept: [phone]           Chart reviewed for the following:   Myah HUDDLESTON MA, have reviewed the medications and updated the Allergic reactions for Mitch Alegria     TIME OUT performed immediately prior to start of procedure:   Myah HUDDLESTON MA, have performed the following reviews on Mitch Alegria prior to the start of the procedure:            * Patient was identified by name and date of birth   * Agreement that prem boot removed and reapplied   * Procedure site verified   * Patient was positioned for comfort  * Verbal consent was given by patient     Time: 0930      Date of procedure: 1/29/2024    Procedure performed by:  [facility] NURSE    How tolerated by patient:   Pain is now under control and can tolerate being touched                                                                                    Comments:  Applied medi-honey, mepelix, exudry, unna boot with kerlix and coban to right leg.

## 2024-02-05 ENCOUNTER — NURSE ONLY (OUTPATIENT)
Age: 49
End: 2024-02-05

## 2024-02-05 NOTE — PROGRESS NOTES
[facility]  [address],  [address]  [facility] [place] [address]  Dept: [phone]           Chart reviewed for the following:   Myah HUDDLESTON MA, have reviewed the medications and updated the Allergic reactions for Mitch Alegria     TIME OUT performed immediately prior to start of procedure:   Myah HUDDLESTON MA, have performed the following reviews on Mitch Alegria prior to the start of the procedure:            * Patient was identified by name and date of birth   * Agreement that prem boot removed and reapplied   * Procedure site verified   * Patient was positioned for comfort  * Verbal consent was given by patient     Time: 1000      Date of procedure: 2/5/2024    Procedure performed by:  VVS NURSE    How tolerated by patient: Wound improving. No swelling and minimal drainage     Comments:  Applied aquacel to wound, mepelix, and unna boot with kerlix and coban.   Measurement 3cmx1.5cm   Told patient to bring his circaid next week to start transitioning back.

## 2024-02-21 ENCOUNTER — NURSE ONLY (OUTPATIENT)
Age: 49
End: 2024-02-21

## 2024-02-21 DIAGNOSIS — L97.919 VENOUS ULCER OF RIGHT LOWER EXTREMITY WITH VARICOSE VEINS (HCC): Primary | ICD-10-CM

## 2024-02-21 DIAGNOSIS — I83.019 VENOUS ULCER OF RIGHT LOWER EXTREMITY WITH VARICOSE VEINS (HCC): Primary | ICD-10-CM

## 2024-02-21 NOTE — PROGRESS NOTES
[facility]  [address],  [address]  [facility] [address]  Dept: [phone]           Chart reviewed for the following:   Myah HUDDLESTON MA, have reviewed the medications and updated the Allergic reactions for Mitch Alegria     TIME OUT performed immediately prior to start of procedure:   Myah HUDDLESTON MA, have performed the following reviews on Mitch Alegria prior to the start of the procedure:            * Patient was identified by name and date of birth   * Agreement that prem boot removed and reapplied   * Procedure site verified   * Patient was positioned for comfort  * Verbal consent was given by patient     Time: 1000      Date of procedure: 2/21/2024    Procedure performed by:  [facility] NURSE    How tolerated by patient:   Tolerated. Wound improving                                                                                    Comments:  Applied allyven and unna boot with coban to right leg.

## 2024-03-06 ENCOUNTER — NURSE ONLY (OUTPATIENT)
Age: 49
End: 2024-03-06
Payer: COMMERCIAL

## 2024-03-06 VITALS — HEIGHT: 72 IN | WEIGHT: 315 LBS | BODY MASS INDEX: 42.66 KG/M2

## 2024-03-06 DIAGNOSIS — L97.919 VENOUS ULCER OF RIGHT LOWER EXTREMITY WITH VARICOSE VEINS (HCC): Primary | ICD-10-CM

## 2024-03-06 DIAGNOSIS — I83.019 VENOUS ULCER OF RIGHT LOWER EXTREMITY WITH VARICOSE VEINS (HCC): Primary | ICD-10-CM

## 2024-03-06 PROCEDURE — 29580 STRAPPING UNNA BOOT: CPT | Performed by: SURGERY

## 2024-03-06 ASSESSMENT — PATIENT HEALTH QUESTIONNAIRE - PHQ9
SUM OF ALL RESPONSES TO PHQ QUESTIONS 1-9: 0
1. LITTLE INTEREST OR PLEASURE IN DOING THINGS: 0
2. FEELING DOWN, DEPRESSED OR HOPELESS: 0
SUM OF ALL RESPONSES TO PHQ9 QUESTIONS 1 & 2: 0
SUM OF ALL RESPONSES TO PHQ QUESTIONS 1-9: 0

## 2024-03-06 NOTE — PROGRESS NOTES
[facility]  [address],  [address]  [facility] [address]  Dept: [phone]           Chart reviewed for the following:   Karyna HUDDLESTON RN, have reviewed the medications and updated the Allergic reactions for Mitch Alegria     TIME OUT performed immediately prior to start of procedure:   Karyna HUDDLESTON RN, have performed the following reviews on Mitch Alegria prior to the start of the procedure:            * Patient was identified by name and date of birth   * Agreement that prem boot removed and reapplied   * Procedure site verified   * Patient was positioned for comfort  * Verbal consent was given by patient     Time: 1030      Date of procedure: 3/6/2024    Procedure performed by:  VVS NURSE    How tolerated by patient:      Pt tolerated well                                                                                 Comments:  removed boot from right leg , washed and patted dry . Wound edges were macerated due to large amount of drainage under the allyvn . Wound it self appears to be closing and noted that the beginning edges look much smaller, applied zinc around the edges and hydro phera blue , covered with gauze , prem boot , kerlex and coban. Pt will return in 1 week.

## 2024-03-13 ENCOUNTER — NURSE ONLY (OUTPATIENT)
Age: 49
End: 2024-03-13

## 2024-03-13 DIAGNOSIS — I83.019 VENOUS ULCER OF RIGHT LOWER EXTREMITY WITH VARICOSE VEINS (HCC): Primary | ICD-10-CM

## 2024-03-13 DIAGNOSIS — L97.919 VENOUS ULCER OF RIGHT LOWER EXTREMITY WITH VARICOSE VEINS (HCC): Primary | ICD-10-CM

## 2024-03-13 NOTE — PROGRESS NOTES
Patients wounds all healed. No drainage and swelling minimal. Advised patient to take a shower and clean area and cover. Apply circaid and encouraged not to wear shoes that rubs. Will RTC in 1 month.

## 2024-08-02 ENCOUNTER — OFFICE VISIT (OUTPATIENT)
Age: 49
End: 2024-08-02
Payer: COMMERCIAL

## 2024-08-02 VITALS
SYSTOLIC BLOOD PRESSURE: 132 MMHG | BODY MASS INDEX: 39.28 KG/M2 | HEIGHT: 72 IN | DIASTOLIC BLOOD PRESSURE: 60 MMHG | HEART RATE: 78 BPM | OXYGEN SATURATION: 98 % | WEIGHT: 290 LBS

## 2024-08-02 DIAGNOSIS — L97.919 VENOUS ULCER OF RIGHT LOWER EXTREMITY WITH VARICOSE VEINS (HCC): Primary | ICD-10-CM

## 2024-08-02 DIAGNOSIS — I83.019 VENOUS ULCER OF RIGHT LOWER EXTREMITY WITH VARICOSE VEINS (HCC): Primary | ICD-10-CM

## 2024-08-02 PROCEDURE — 3075F SYST BP GE 130 - 139MM HG: CPT | Performed by: SURGERY

## 2024-08-02 PROCEDURE — 3078F DIAST BP <80 MM HG: CPT | Performed by: SURGERY

## 2024-08-02 PROCEDURE — 99213 OFFICE O/P EST LOW 20 MIN: CPT | Performed by: SURGERY

## 2024-08-02 NOTE — PROGRESS NOTES
Follow Up Wound Care Note    Mitch Alegria is a 48 y.o. male with  has a past medical history of Hypertension and Stroke (HCC) (2017). .     Patient is well known to our clinic and was previously treated for R goot wound    He now returns with a new wound on the right leg. He states that he developed a large blister about 2 weeks ago which ruptured and has not healed since.   He denies pain in the wound and does not think there is a lot of drainage     Wound Assessment:     Wound location: right anterior shin     Wound Size: 2 cm x 1.5 cm x 0.1 cm    (Previous visit wound size: not available - new wound)    Tissue characteristics:  clean wound base, no surrounding erythema, no drainage. Mild fibrinous exudate    Patient compliant with treatment? N/a  [] Yes  [] No       Today:               Treatment Plan and Interventions:     48 y.o. male with spontaneous right leg ulceration, complicated by presence of lymphedema.    Recommend initiation of advanced wound care.   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  .     Past Medical History:   Diagnosis Date    Hypertension     Stroke (HCC) 2017        Past Surgical History:   Procedure Laterality Date    HERNIA REPAIR  2003       Current Outpatient Medications   Medication Sig Dispense Refill    furosemide (LASIX) 40 MG tablet       lisinopril (PRINIVIL;ZESTRIL) 10 MG tablet       hydrALAZINE

## 2024-08-05 ENCOUNTER — NURSE ONLY (OUTPATIENT)
Age: 49
End: 2024-08-05

## 2024-08-05 NOTE — PROGRESS NOTES
[facility]  [address]  [address]  [facility] [address]  Dept: [phone]           Chart reviewed for the following:   Myah HUDDLESTON MA, have reviewed the medications and updated the Allergic reactions for Mitch Alegria     TIME OUT performed immediately prior to start of procedure:   Myah HUDDLESTON MA, have performed the following reviews on Mitch Alegria prior to the start of the procedure:            * Patient was identified by name and date of birth   * Agreement that prem boot removed and reapplied   * Procedure site verified   * Patient was positioned for comfort  * Verbal consent was given by patient     Time: 1345      Date of procedure: 8/5/2024    Procedure performed by:  VVS NURSE    How tolerated by patient:    Tolerated. No heavy drainage                                                                                  Comments:  Applied hydrogel, mepilex and unna boot with kerlix and coban to right leg.

## 2024-08-14 ENCOUNTER — NURSE ONLY (OUTPATIENT)
Age: 49
End: 2024-08-14

## 2024-08-14 DIAGNOSIS — L97.919 VENOUS ULCER OF RIGHT LOWER EXTREMITY WITH VARICOSE VEINS (HCC): Primary | ICD-10-CM

## 2024-08-14 DIAGNOSIS — I83.019 VENOUS ULCER OF RIGHT LOWER EXTREMITY WITH VARICOSE VEINS (HCC): Primary | ICD-10-CM

## 2024-08-14 NOTE — PROGRESS NOTES
[facility]  [address]  [address]  [facility] [address]  Dept: [phone]           Chart reviewed for the following:   Lorenzo HUDDLESTON, have reviewed the medications and updated the Allergic reactions for Mitch Alegria     TIME OUT performed immediately prior to start of procedure:   Lorenzo HUDDLESTON, have performed the following reviews on Mitch Alegria prior to the start of the procedure:            * Patient was identified by name and date of birth   * Agreement that prem boot removed and reapplied   * Procedure site verified   * Patient was positioned for comfort  * Verbal consent was given by patient     Time: 0200      Date of procedure: 8/14/2024    Procedure performed by:  VVS NURSE    How tolerated by patient: Tolerated. No drainage present.    Comments:  Removed old dressing, washed leg thoroughly. Applied Hydrogel, ABD Pad. Placed Pt in Unna boot with Kerlix and Coban.

## 2024-08-21 ENCOUNTER — NURSE ONLY (OUTPATIENT)
Age: 49
End: 2024-08-21

## 2024-08-21 DIAGNOSIS — L97.919 VENOUS ULCER OF RIGHT LOWER EXTREMITY WITH VARICOSE VEINS (HCC): Primary | ICD-10-CM

## 2024-08-21 DIAGNOSIS — I83.019 VENOUS ULCER OF RIGHT LOWER EXTREMITY WITH VARICOSE VEINS (HCC): Primary | ICD-10-CM

## 2024-08-21 NOTE — PROGRESS NOTES
[facility]  [address]  [address]  [facility] [address]  Dept: [phone]           Chart reviewed for the following:   Lorenzo HUDDLESTON, have reviewed the medications and updated the Allergic reactions for Mitch Alegria     TIME OUT performed immediately prior to start of procedure:   Lorenzo HUDDLESTON, have performed the following reviews on Mitch Alegria prior to the start of the procedure:            * Patient was identified by name and date of birth   * Agreement that prem boot removed and reapplied   * Procedure site verified   * Patient was positioned for comfort  * Verbal consent was given by patient     Time: 0100      Date of procedure: 8/21/2024    Procedure performed by:  VVS NURSE    How tolerated by patient: Tolerated, Pt developed a new blister on leg.    Comments:  Washed legs thoroughly, applied Hydrogel to wound site. Applied ABD Pad and Placed Pt in Unna boot with Kerlix and Coban.

## 2024-09-18 ENCOUNTER — NURSE ONLY (OUTPATIENT)
Age: 49
End: 2024-09-18

## 2024-09-18 DIAGNOSIS — L97.919 VENOUS ULCER OF RIGHT LOWER EXTREMITY WITH VARICOSE VEINS (HCC): Primary | ICD-10-CM

## 2024-09-18 DIAGNOSIS — I83.019 VENOUS ULCER OF RIGHT LOWER EXTREMITY WITH VARICOSE VEINS (HCC): Primary | ICD-10-CM
